# Patient Record
Sex: FEMALE | Race: BLACK OR AFRICAN AMERICAN | NOT HISPANIC OR LATINO | Employment: OTHER | URBAN - METROPOLITAN AREA
[De-identification: names, ages, dates, MRNs, and addresses within clinical notes are randomized per-mention and may not be internally consistent; named-entity substitution may affect disease eponyms.]

---

## 2019-04-20 ENCOUNTER — HOSPITAL ENCOUNTER (EMERGENCY)
Facility: HOSPITAL | Age: 49
Discharge: HOME | End: 2019-04-20
Attending: EMERGENCY MEDICINE
Payer: MEDICAID

## 2019-04-20 VITALS
DIASTOLIC BLOOD PRESSURE: 84 MMHG | HEIGHT: 61 IN | SYSTOLIC BLOOD PRESSURE: 187 MMHG | RESPIRATION RATE: 18 BRPM | OXYGEN SATURATION: 100 % | TEMPERATURE: 99 F | HEART RATE: 71 BPM | BODY MASS INDEX: 35.32 KG/M2 | WEIGHT: 187.06 LBS

## 2019-04-20 DIAGNOSIS — S93.401A SPRAIN OF RIGHT ANKLE, UNSPECIFIED LIGAMENT, INITIAL ENCOUNTER: Primary | ICD-10-CM

## 2019-04-20 DIAGNOSIS — I10 HYPERTENSION, UNSPECIFIED TYPE: ICD-10-CM
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
## 2019-04-20 PROCEDURE — 25000003 PHARM REV CODE 250: Performed by: PHYSICIAN ASSISTANT

## 2019-04-20 PROCEDURE — 99284 EMERGENCY DEPT VISIT MOD MDM: CPT

## 2019-04-20 PROCEDURE — 86703 HIV-1/HIV-2 1 RESULT ANTBDY: CPT

## 2019-04-20 RX ORDER — ACETAMINOPHEN 325 MG/1
325 TABLET ORAL
Status: DISCONTINUED | OUTPATIENT
Start: 2019-04-20 | End: 2019-04-20 | Stop reason: HOSPADM

## 2019-04-20 RX ORDER — LOSARTAN POTASSIUM 50 MG/1
100 TABLET ORAL DAILY
Status: DISCONTINUED | OUTPATIENT
Start: 2019-04-20 | End: 2019-04-20 | Stop reason: HOSPADM

## 2019-04-20 RX ORDER — HYDROCHLOROTHIAZIDE 25 MG/1
25 TABLET ORAL
Status: COMPLETED | OUTPATIENT
Start: 2019-04-20 | End: 2019-04-20

## 2019-04-20 RX ORDER — ACETAMINOPHEN 325 MG/1
650 TABLET ORAL
Status: COMPLETED | OUTPATIENT
Start: 2019-04-20 | End: 2019-04-20

## 2019-04-20 RX ADMIN — LOSARTAN POTASSIUM 100 MG: 50 TABLET, FILM COATED ORAL at 09:04

## 2019-04-20 RX ADMIN — ACETAMINOPHEN 650 MG: 325 TABLET ORAL at 09:04

## 2019-04-20 RX ADMIN — HYDROCHLOROTHIAZIDE 25 MG: 25 TABLET ORAL at 09:04

## 2019-04-20 NOTE — ED PROVIDER NOTES
History      Chief Complaint   Patient presents with    Fall     Pt states she fell in a hole yesterday. Pain to R ankle/foot & L shoulder. Pt denies hitting head.        Review of patient's allergies indicates:   Allergen Reactions    Penicillins         HPI   HPI    4/20/2019, 9:01 AM   History obtained from the patient      History of Present Illness: Michele Clarke is a 48 y.o. female patient who presents to the Emergency Department for right ankle pain since twisting it pta. Able to weight bear immediately after and now with limp.  Denies knee pain or injury elsewhere.  Symptoms are constant and moderate in severity.     No further complaints or concerns at this time.     Blood pressure is elevated.  Pt denies cp, sob, ha, dizziness, vision change.          PCP: Primary Doctor No       Past Medical History:  No past medical history on file.      Past Surgical History:  No past surgical history on file.        Family History:  No family history on file.        Social History:  Social History     Tobacco Use    Smoking status: Not on file   Substance and Sexual Activity    Alcohol use: Not on file    Drug use: Not on file    Sexual activity: Not on file       ROS   Review of Systems   Constitutional: Negative for chills and fever.   HENT: Negative for facial swelling and sore throat.    Eyes: Negative for pain and visual disturbance.   Respiratory: Negative for chest tightness and shortness of breath.    Cardiovascular: Negative for chest pain and palpitations.   Gastrointestinal: Negative for abdominal distention and abdominal pain.   Endocrine: Negative for cold intolerance and heat intolerance.   Genitourinary: Negative for dysuria and hematuria.   Musculoskeletal: Negative for neck pain and neck stiffness.  Positive for ankle pain  Skin: Negative for color change and pallor.   Neurological: Negative for syncope and weakness.   Hematological: Negative for adenopathy. Does not bruise/bleed easily.   All  other systems reviewed and are negative.    Review of Systems    Physical Exam      Initial Vitals [04/20/19 0851]   BP Pulse Resp Temp SpO2   (!) 189/112 86 20 99.1 °F (37.3 °C) 98 %      MAP       --         Physical Exam  Vital signs and nursing notes reviewed.  Constitutional: Patient is in NAD. Awake and alert. Well-developed and well-nourished.  Head: Atraumatic. Normocephalic.  Eyes: PERRL. EOM intact. Conjunctivae nl. No scleral icterus.  ENT: Mucous membranes are moist. Oropharynx is clear.  Neck: Supple. No JVD. No lymphadenopathy.  No meningismus  Cardiovascular: Regular rate and rhythm. No murmurs, rubs, or gallops. Distal pulses are 2+ and symmetric.  Pulmonary/Chest: No respiratory distress. Clear to auscultation bilaterally. No wheezing, rales, or rhonchi.  Abdominal: Soft. Non-distended. No TTP. No rebound, guarding, or rigidity. Good bowel sounds.  Genitourinary: No CVA tenderness  Musculoskeletal: Moves all extremities.  Right ankle with mild ttp to lateral malleolus.   Nontender medial malleolus, tarsals and base of 5th metatarsal.  Knee nontender with from.  No proximal tibial ttp.  2+ DP.  Normal sensation to toes x 5.  Skin: Warm and dry.  Neurological: Awake and alert. No acute focal neurological deficits are appreciated.  Psychiatric: Normal affect. Good eye contact. Appropriate in content.      ED Course          Splint Application  Date/Time: 4/20/2019 10:41 AM  Performed by: Natalia Sosa PA-C  Authorized by: Yandy Lynn Do, MD   Consent Done: Yes  Consent: Verbal consent obtained.  Risks and benefits: risks, benefits and alternatives were discussed  Consent given by: patient  Patient understanding: patient states understanding of the procedure being performed  Patient consent: the patient's understanding of the procedure matches consent given  Procedure consent: procedure consent matches procedure scheduled  Location details: right ankle  Supplies used: elastic  "bandage  Post-procedure: The splinted body part was neurovascularly unchanged following the procedure.  Patient tolerance: Patient tolerated the procedure well with no immediate complications        ED Vital Signs:  Vitals:    04/20/19 0851 04/20/19 0917 04/20/19 0955 04/20/19 1005   BP: (!) 189/112 (!) 179/105 (!) 184/82 (!) 187/84   Pulse: 86 74 75 71   Resp: 20 18 18 18   Temp: 99.1 °F (37.3 °C)      TempSrc: Oral      SpO2: 98% 100% 100% 100%   Weight: 84.8 kg (187 lb 1 oz)      Height: 5' 1" (1.549 m)                    Imaging Results:  Imaging Results          X-Ray Foot Complete Right (Final result)  Result time 04/20/19 09:45:22    Final result by Alfonso Lora III, MD (04/20/19 09:45:22)                 Impression:      Degenerative change about the foot of a mild degree.  No acute fracture.  No dislocation.      Electronically signed by: Alfonso Lora MD  Date:    04/20/2019  Time:    09:45             Narrative:    EXAMINATION:  XR FOOT COMPLETE 3 VIEW RIGHT    CLINICAL HISTORY:  Fall on same level from slipping, tripping and stumbling without subsequent striking against object, initial encounter    COMPARISON:  None    FINDINGS:  Minimal to mild calcaneal spurring.  Minimal degenerative change 1st metatarsophalangeal joint.  No fracture.  No dislocation.                               X-Ray Ankle Complete Right (Final result)  Result time 04/20/19 09:44:17    Final result by Alfonso Lora III, MD (04/20/19 09:44:17)                 Impression:      No acute abnormality suggested.      Electronically signed by: Alfonso Lora MD  Date:    04/20/2019  Time:    09:44             Narrative:    EXAMINATION:  XR ANKLE COMPLETE 3 VIEW RIGHT    CLINICAL HISTORY:  Fall on same level from slipping, tripping and stumbling without subsequent striking against object, initial encounter    COMPARISON:  None    FINDINGS:  No acute fracture.  No dislocation.  Minimal calcaneal spurring.  No lytic or " blastic change.                                   The Emergency Provider reviewed the vital signs and test results, which are outlined above.    ED Discussion       All findings were reviewed with the patient/family in detail.   All remaining questions and concerns were addressed at that time.  Patient/family has been counseled regarding the need for follow-up as well as the indication to return to the emergency room should new or worrisome developments occur.      Medication(s) given in the ER:  Medications   losartan tablet 100 mg (100 mg Oral Given 4/20/19 0923)   acetaminophen tablet 325 mg (325 mg Oral Not Given 4/20/19 0930)   hydroCHLOROthiazide tablet 25 mg (25 mg Oral Given 4/20/19 0917)   acetaminophen tablet 650 mg (650 mg Oral Given 4/20/19 0917)           Follow-up Information     [facility] In 2 days.    Contact information:  [facility] [facility] [address] [phone]                          Medication List      You have not been prescribed any medications.             Medical Decision Making        MDM               Clinical Impression:        ICD-10-CM ICD-9-CM   1. Sprain of right ankle, unspecified ligament, initial encounter S93.401A 845.00   2. Fall from slip, trip, or stumble, initial encounter W01.0XXA E885.9   3. Hypertension, unspecified type I10 401.9             Natalia Sosa PA-C  04/20/19 1040

## 2019-04-20 NOTE — ED NOTES
"Pt reports falling into a hole with her right foot yesterday while playing volleyball. She states "I have some shoulder pain too on the left because I went backward when I fell."    Patient identifiers verified and correct for Michele Clarke.    LOC: The patient is awake, alert and aware of environment with an appropriate affect, the patient is oriented x 3 and speaking appropriately.  APPEARANCE: Patient resting comfortably and in no acute distress, patient is clean and well groomed, patient's clothing is properly fastened.  SKIN: The skin is warm and dry, color consistent with ethnicity, patient has normal skin turgor and moist mucus membranes, skin intact, no breakdown or bruising noted.  MUSCULOSKELETAL: Patient moving all extremities spontaneously. ** exception - grimacing and guarding with assessment of right ankle and shoulder. No significant swelling, bruising, or deformity noted.  RESPIRATORY: Airway is open and patent, respirations are spontaneous.  CARDIAC: Patient has a normal rate, no periphreal edema noted, capillary refill < 3 seconds.  ABDOMEN: Soft and non tender to palpation.  "

## 2019-04-22 LAB — HIV 1+2 AB+HIV1 P24 AG SERPL QL IA: NEGATIVE

## 2022-09-25 NOTE — ED NOTES
Upon administering 325mg of Tylenol the pill fell on the floor. RN called Flor[facility] in pharmacy, tylenol 325mg reordered and given.    contact guard